# Patient Record
Sex: MALE | Race: OTHER | Employment: FULL TIME | URBAN - NONMETROPOLITAN AREA
[De-identification: names, ages, dates, MRNs, and addresses within clinical notes are randomized per-mention and may not be internally consistent; named-entity substitution may affect disease eponyms.]

---

## 2020-05-04 ENCOUNTER — HOSPITAL ENCOUNTER (EMERGENCY)
Age: 30
Discharge: HOME | End: 2020-05-04
Attending: EMERGENCY MEDICINE

## 2020-05-04 VITALS
OXYGEN SATURATION: 97 % | TEMPERATURE: 98.5 F | RESPIRATION RATE: 18 BRPM | HEART RATE: 65 BPM | SYSTOLIC BLOOD PRESSURE: 135 MMHG | DIASTOLIC BLOOD PRESSURE: 73 MMHG

## 2020-05-04 PROCEDURE — 99283 EMERGENCY DEPT VISIT LOW MDM: CPT

## 2020-05-04 PROCEDURE — 6360000002 HC RX W HCPCS: Performed by: EMERGENCY MEDICINE

## 2020-05-04 PROCEDURE — 6370000000 HC RX 637 (ALT 250 FOR IP): Performed by: EMERGENCY MEDICINE

## 2020-05-04 PROCEDURE — 96372 THER/PROPH/DIAG INJ SC/IM: CPT

## 2020-05-04 RX ORDER — IBUPROFEN 200 MG
200 TABLET ORAL EVERY 6 HOURS PRN
COMMUNITY

## 2020-05-04 RX ORDER — MEPERIDINE HYDROCHLORIDE 25 MG/ML
50 INJECTION INTRAMUSCULAR; INTRAVENOUS; SUBCUTANEOUS ONCE
Status: COMPLETED | OUTPATIENT
Start: 2020-05-04 | End: 2020-05-04

## 2020-05-04 RX ORDER — KETOROLAC TROMETHAMINE 30 MG/ML
30 INJECTION, SOLUTION INTRAMUSCULAR; INTRAVENOUS ONCE
Status: COMPLETED | OUTPATIENT
Start: 2020-05-04 | End: 2020-05-04

## 2020-05-04 RX ORDER — HYDROCODONE BITARTRATE AND ACETAMINOPHEN 5; 325 MG/1; MG/1
1 TABLET ORAL ONCE
Status: COMPLETED | OUTPATIENT
Start: 2020-05-04 | End: 2020-05-04

## 2020-05-04 RX ORDER — ONDANSETRON 4 MG/1
4 TABLET, ORALLY DISINTEGRATING ORAL ONCE
Status: COMPLETED | OUTPATIENT
Start: 2020-05-04 | End: 2020-05-04

## 2020-05-04 RX ADMIN — MEPERIDINE HYDROCHLORIDE 50 MG: 25 INJECTION INTRAMUSCULAR; INTRAVENOUS; SUBCUTANEOUS at 21:24

## 2020-05-04 RX ADMIN — ONDANSETRON 4 MG: 4 TABLET, ORALLY DISINTEGRATING ORAL at 21:24

## 2020-05-04 RX ADMIN — KETOROLAC TROMETHAMINE 30 MG: 30 INJECTION, SOLUTION INTRAMUSCULAR at 21:24

## 2020-05-04 RX ADMIN — HYDROCODONE BITARTRATE AND ACETAMINOPHEN 1 TABLET: 5; 325 TABLET ORAL at 22:23

## 2020-05-04 SDOH — HEALTH STABILITY: MENTAL HEALTH: HOW OFTEN DO YOU HAVE A DRINK CONTAINING ALCOHOL?: NEVER

## 2020-05-04 ASSESSMENT — ENCOUNTER SYMPTOMS
SHORTNESS OF BREATH: 0
WHEEZING: 0
DIARRHEA: 0
ABDOMINAL PAIN: 0
BACK PAIN: 1
BLOOD IN STOOL: 0
SORE THROAT: 0

## 2020-05-04 ASSESSMENT — PAIN SCALES - GENERAL
PAINLEVEL_OUTOF10: 10
PAINLEVEL_OUTOF10: 5
PAINLEVEL_OUTOF10: 10

## 2020-05-04 ASSESSMENT — PAIN DESCRIPTION - ORIENTATION: ORIENTATION: LEFT

## 2020-05-04 ASSESSMENT — PAIN DESCRIPTION - PAIN TYPE: TYPE: ACUTE PAIN

## 2020-05-04 ASSESSMENT — PAIN DESCRIPTION - LOCATION: LOCATION: BACK

## 2020-05-05 NOTE — ED TRIAGE NOTES
Pt presents with complaints of low back pain. Pt states he lifted something heavy a year ago and has had back pain off and on ever since. Pt was lifting a beam at home on Friday and hurt his lower left back again. Pt has some N/T in his left leg. Pt alert and oriented. Skin pink warm and dry.

## 2020-05-05 NOTE — ED PROVIDER NOTES
[facility]  [address] [facility] [address]  Phone: [phone]    eMERGENCY dEPARTMENT eNCOUnter           [address] [facility]       Chief Complaint   Patient presents with    Back Pain       Nurses Notes reviewed and I agree except as noted in the HPI. HISTORY OF PRESENT ILLNESS    Randall Carrel is a 34 y.o. male who presented via private vehicle with the chief complaint mentioned above. He stated that he was lifting a heavy beam yesterday when he developed sudden back pain. He describes his pain as moderate sharp left lower back pain which is worse with movement and bending. Pain did not radiate anywhere. He denies leg weakness or numbness. He denies bladder or bowel problems. He denies saddle paresthesia. He said that he has a history of chronic recurrent back pain. REVIEW OF SYSTEMS     Review of Systems   Constitutional: Negative for chills and fever. HENT: Negative for sore throat. Respiratory: Negative for shortness of breath and wheezing. Cardiovascular: Negative for chest pain and palpitations. Gastrointestinal: Negative for abdominal pain, blood in stool and diarrhea. Genitourinary: Negative for dysuria and hematuria. Musculoskeletal: Positive for back pain. Negative for neck pain. Neurological: Negative for seizures, syncope and headaches. Psychiatric/Behavioral: Negative for confusion. PAST MEDICAL HISTORY    has no past medical history on file. SURGICAL HISTORY      has no past surgical history on file. CURRENT MEDICATIONS       Discharge Medication List as of 5/4/2020 10:05 PM      CONTINUE these medications which have NOT CHANGED    Details   ibuprofen (ADVIL;MOTRIN) 200 MG tablet Take 200 mg by mouth every 6 hours as needed for PainHistorical Med             ALLERGIES     has No Known Allergies. FAMILY HISTORY     has no family status information on file. family history is not on file.     SOCIAL HISTORY reports that he has never smoked. He has never used smokeless tobacco. He reports that he does not drink alcohol or use drugs. PHYSICAL EXAM     INITIAL VITALS:  temporal temperature is 98.5 °F (36.9 °C). His blood pressure is 135/73 and his pulse is 65. His respiration is 18 and oxygen saturation is 97%. Physical Exam   Constitutional: He appears well-developed and well-nourished. Looks slightly uncomfortable   HENT:   Head: Atraumatic. Neck: Neck supple. Cardiovascular: Normal rate and regular rhythm. Exam reveals no gallop and no friction rub. No murmur heard. Pulmonary/Chest: Effort normal and breath sounds normal.   Abdominal: Soft. Bowel sounds are normal. There is no abdominal tenderness. Musculoskeletal:      Comments: Examination of the back showed no visible abnormality. There was no midline tenderness. He has tenderness to percussion over the left paralumbar soft tissue area. Neurological: He is alert. He has normal strength and normal reflexes. Normal motor strength in both legs   Nursing note and vitals reviewed. DIFFERENTIAL DIAGNOSIS:       DIAGNOSTIC RESULTS     LABS:   Labs Reviewed - No data to display    EMERGENCY DEPARTMENT COURSE:   Vitals:    Vitals:    05/04/20 2053 05/04/20 2158 05/04/20 2223   BP: (!) 150/77 135/73    Pulse: 66 65    Resp: 18 18 18   Temp: 98.5 °F (36.9 °C)     TempSrc: Temporal     SpO2: 98% 97% 97%     He received Demerol and Toradol IM, his pain improved  he was reassured that his neurological examination is normal and there is no red flags for spinal cord emergency. FINAL IMPRESSION      1. Strain of lumbar region, initial encounter    2. Acute left-sided low back pain without sciatica          DISPOSITION/PLAN   He was discharged to his wife in good condition, he was given discharge instructions and was advised to return if worse or new symptoms.     PATIENT REFERRED TO:  TEX Hinojosa CNP [address]

## 2021-12-11 ENCOUNTER — HOSPITAL ENCOUNTER (EMERGENCY)
Age: 31
Discharge: HOME | End: 2021-12-11
Attending: FAMILY MEDICINE

## 2021-12-11 VITALS
DIASTOLIC BLOOD PRESSURE: 71 MMHG | WEIGHT: 238 LBS | BODY MASS INDEX: 43.79 KG/M2 | OXYGEN SATURATION: 98 % | HEART RATE: 75 BPM | SYSTOLIC BLOOD PRESSURE: 126 MMHG | RESPIRATION RATE: 16 BRPM | HEIGHT: 62 IN | TEMPERATURE: 97.2 F

## 2021-12-11 DIAGNOSIS — T15.91XA EYE FOREIGN BODY, RIGHT, INITIAL ENCOUNTER: Primary | ICD-10-CM

## 2021-12-11 PROCEDURE — 2500000003 HC RX 250 WO HCPCS

## 2021-12-11 PROCEDURE — 67938 REMOVE EYELID FOREIGN BODY: CPT

## 2021-12-11 PROCEDURE — 99283 EMERGENCY DEPT VISIT LOW MDM: CPT

## 2021-12-11 RX ORDER — PROPARACAINE HYDROCHLORIDE 5 MG/ML
SOLUTION/ DROPS OPHTHALMIC
Status: COMPLETED
Start: 2021-12-11 | End: 2021-12-11

## 2021-12-11 RX ADMIN — PROPARACAINE HYDROCHLORIDE: 5 SOLUTION/ DROPS OPHTHALMIC at 11:40

## 2021-12-11 ASSESSMENT — PAIN DESCRIPTION - DESCRIPTORS: DESCRIPTORS: ACHING

## 2021-12-11 ASSESSMENT — ENCOUNTER SYMPTOMS
SORE THROAT: 0
DIARRHEA: 0
COUGH: 0
ABDOMINAL PAIN: 0
CONSTIPATION: 0
NAUSEA: 0
VOMITING: 0
EYE REDNESS: 1
EYE DISCHARGE: 0
EYE PAIN: 1
WHEEZING: 0
SHORTNESS OF BREATH: 0
PHOTOPHOBIA: 0

## 2021-12-11 ASSESSMENT — PAIN SCALES - GENERAL: PAINLEVEL_OUTOF10: 5

## 2021-12-11 ASSESSMENT — PAIN DESCRIPTION - LOCATION: LOCATION: EYE

## 2021-12-11 ASSESSMENT — PAIN DESCRIPTION - ORIENTATION: ORIENTATION: RIGHT

## 2021-12-11 ASSESSMENT — PAIN DESCRIPTION - PAIN TYPE: TYPE: ACUTE PAIN

## 2021-12-11 NOTE — ED PROVIDER NOTES
file.    SOCIAL HISTORY      reports that he has never smoked. He has never used smokeless tobacco. He reports that he does not drink alcohol and does not use drugs. PHYSICAL EXAM     INITIAL VITALS:  height is 5' 2\" (1.575 m) and weight is 238 lb (108 kg). His tympanic temperature is 97.2 °F (36.2 °C). His blood pressure is 126/71 and his pulse is 75. His respiration is 16 and oxygen saturation is 98%. Physical Exam  Vitals and nursing note reviewed. Constitutional:       General: He is not in acute distress. Appearance: He is not diaphoretic. HENT:      Head: Normocephalic and atraumatic. Eyes:      General:         Right eye: No discharge. Left eye: No discharge. Extraocular Movements: Extraocular movements intact. Pupils: Pupils are equal, round, and reactive to light. Comments: Minimal right-sided conjunctival injection noted medial to the iris. Fluorescein stain and Woods lamp were used and tiny piece of debris, size of a grain of sand, noted adhering to right upper eyelid. Cardiovascular:      Rate and Rhythm: Normal rate and regular rhythm. Heart sounds: Normal heart sounds. Pulmonary:      Effort: Pulmonary effort is normal.      Breath sounds: Normal breath sounds. Abdominal:      General: Bowel sounds are normal. There is no distension. Palpations: Abdomen is soft. Tenderness: There is no abdominal tenderness. Lymphadenopathy:      Cervical: No cervical adenopathy. Skin:     General: Skin is warm and dry. Neurological:      Mental Status: He is alert.          DIFFERENTIAL DIAGNOSIS:   Foreign body in right eye, corneal abrasion, corneal ulceration    DIAGNOSTIC RESULTS         LABS:   Labs Reviewed - No data to display    DEPARTMENT COURSE:   Vitals:    Vitals:    12/11/21 1048 12/11/21 1145   BP: (!) 145/79 126/71   Pulse: 74 75   Resp: 16 16   Temp: 97.2 °F (36.2 °C)    TempSrc: Tympanic    SpO2: 99% 98%   Weight: 238 lb (108 kg) Height: 5' 2\" (1.575 m)        MDM:  Patient presents for evaluation of possible foreign body in right eye. Tiny piece of debris noted and removed. No corneal abrasion noted on the right. He is discharged home in stable condition with recommended use of over-the-counter lubricating drops for the next few days. Patient understood and was removed with mostly implants. CRITICAL CARE:   None    CONSULTS:  None    PROCEDURES:  Procaine drops placed in the right eye. Fluorescein stain and Woods lamp used to identify presence of right eye corneal abrasion or presence of foreign body. Tiny sand grain sized debris noted on upper eyelid which was removed using a cotton tip moistened with sterile water. No corneal abrasions noted. Patient tolerated the procedure. FINAL IMPRESSION      1.  Eye foreign body, right, initial encounter          DISPOSITION/PLAN   Discharge    PATIENT REFERRED TO:  TEX Ritchie - CNP  [address] [facility]  [phone]    Schedule an appointment as soon as possible for a visit   As needed      DISCHARGEMEDICATIONS:  Discharge Medication List as of 12/11/2021 11:57 AM          (Please note that portions of this note were completedwith a voice recognition program.  Efforts were made to edit the dictations but occasionally words are mis-transcribed.)    MD Daniel Dias MD  12/11/21 5469

## 2021-12-11 NOTE — ED NOTES
Pt resting, resp even and unlabored, skin pink, warm and dry. Pt denies having any eye pain at this time. Pt given discharge instructions and verbalizes understanding, pt released.      Capri Haskins RN  12/11/21 9727

## 2021-12-11 NOTE — ED NOTES
Pt complains of r watery eye and eye irritation. Pt thinks he might have got some dust in his eye and complains of eye feeling scratchy. Pt alert, resp even and unlabored, skin pink, warm and dry, r eye redness noted.      Ricardo Sandoval RN  12/11/21 8618

## 2024-04-27 ENCOUNTER — HOSPITAL ENCOUNTER (EMERGENCY)
Age: 34
Discharge: HOME | End: 2024-04-27
Attending: EMERGENCY MEDICINE

## 2024-04-27 VITALS
OXYGEN SATURATION: 100 % | HEART RATE: 72 BPM | SYSTOLIC BLOOD PRESSURE: 153 MMHG | DIASTOLIC BLOOD PRESSURE: 84 MMHG | RESPIRATION RATE: 16 BRPM | BODY MASS INDEX: 44.26 KG/M2 | WEIGHT: 242 LBS | TEMPERATURE: 99.2 F

## 2024-04-27 DIAGNOSIS — G51.0 BELL'S PALSY: Primary | ICD-10-CM

## 2024-04-27 LAB — GLUCOSE BLD STRIP.AUTO-MCNC: 91 MG/DL (ref 70–108)

## 2024-04-27 PROCEDURE — 82948 REAGENT STRIP/BLOOD GLUCOSE: CPT

## 2024-04-27 PROCEDURE — 99283 EMERGENCY DEPT VISIT LOW MDM: CPT

## 2024-04-27 RX ORDER — VALACYCLOVIR HYDROCHLORIDE 1 G/1
1000 TABLET, FILM COATED ORAL 2 TIMES DAILY
Qty: 14 TABLET | Refills: 0 | Status: SHIPPED | OUTPATIENT
Start: 2024-04-27 | End: 2024-05-04

## 2024-04-27 RX ORDER — PREDNISONE 10 MG/1
TABLET ORAL
Qty: 36 TABLET | Refills: 0 | Status: SHIPPED | OUTPATIENT
Start: 2024-04-27

## 2024-04-27 ASSESSMENT — PAIN - FUNCTIONAL ASSESSMENT
PAIN_FUNCTIONAL_ASSESSMENT: NONE - DENIES PAIN
PAIN_FUNCTIONAL_ASSESSMENT: NONE - DENIES PAIN

## 2024-04-27 NOTE — ED PROVIDER NOTES
[facility]  [address] [place] [id number]  Phone: [phone]  EMERGENCY DEPARTMENT ENCOUNTER      Pt Name: Godfrey Kamara  MRN: [id number]  Birthdate 1990  Date of evaluation: 4/27/2024  Provider: Jose J Gale MD    CHIEF COMPLAINT       Chief Complaint   Patient presents with    Facial Droop     Wed evening started with right facial drooping, right side of face feels funny.          HISTORY OF PRESENT ILLNESS      Godfrey Kamara is a 33 y.o. male who presents to the emergency department with above-noted complaint.  Patient had some facial discomfort started on Wednesday.  Today is currently Saturday.  Started with some discomfort in his right side of his neck trapezius area did not feel right and then the next day send she woke up and had some facial droop.  He has a right-sided facial droop.  Denies other pain headache neck pain vomiting or other problems        REVIEW OF SYSTEMS     Problems with facial grimace  Review of Systems  All systems negative except as marked.     PAST MEDICAL HISTORY     History reviewed. No pertinent past medical history.      SURGICAL HISTORY       History reviewed. No pertinent surgical history.      CURRENT MEDICATIONS       Previous Medications    IBUPROFEN (ADVIL;MOTRIN) 200 MG TABLET    Take 200 mg by mouth every 6 hours as needed for Pain       ALLERGIES       Patient has no known allergies.    FAMILY HISTORY       Family History   Problem Relation Age of Onset    Pneumonia Father           SOCIAL HISTORY       Social History     Tobacco Use    Smoking status: Never    Smokeless tobacco: Never   Substance Use Topics    Alcohol use: Never     Comment: occassionally    Drug use: Never         PHYSICAL EXAM           Physical Exam    VITAL SIGNS: BP (!) 153/84   Pulse 72   Temp 99.2 °F (37.3 °C) (Temporal)   Resp 16   Wt 109.8 kg (242 lb)   SpO2 100%   BMI 44.26 kg/m²    Constitutional:  Alert not toxtic or ill,   HENT:   used:yes       FINAL  REASSESSMENT     4:14 PM     Classic findings of Bell's palsy at this time.  No other interventions.  Cover with Valtrex and prednisone.  Symptoms started on Wednesday not a candidate for any other interventions even if this were related to CVA or other intracranial disaster      PROCEDURES:   none    Procedures     FINAL IMPRESSION      1. Bell's palsy          DISPOSITION/PLAN     DISPOSITION Decision To Discharge 04/27/2024 04:12:25 PM      PATIENT REFERRED TO:  Taisha Mcguire, APRN - [place] [id number] [place] [id number] [phone]    Call   Follow up from ER condition      DISCHARGE MEDICATIONS:  New Prescriptions    PREDNISONE (DELTASONE) 10 MG TABLET    6 p.o., then 4 p.o. q. day for 3 days, 3 p.o. q. day for 3 days, 2 p.o. q. day for 3 days, one p.o. q. day for 3 days    VALACYCLOVIR (VALTREX) 1 G TABLET    Take 1 tablet by mouth 2 times daily for 7 days       (Please note that portions of this note were completed with a voice recognition program.  Efforts were made to edit the dictations but occasionally words are mis-transcribed.)    Jose J Gale MD (electronically signed)  Attending Emergency Physician                      Jose J Gale MD  04/27/24 9066

## 2024-04-27 NOTE — ED NOTES
Pt pink, warm and dry, breathing with ease. Lubricating eye drops encouraged before bedtime. Prescriptions explained, pt states understanding. AVS reviewed. Denies questions or concerns. Pt remains alert and oriented. Pt discharged in stable condition.

## 2024-04-27 NOTE — DISCHARGE INSTRUCTIONS
Wear protective sunglasses to make sure nothing gets bone in your eyes.  You may use saline eyedrops to to keep your eyes moist as this can get a dry eye.  Use Tylenol for any aches or pains.  Take prednisone daily.  Take Valtrex twice a day.  Follow-up with primary care this week

## 2024-04-27 NOTE — ED TRIAGE NOTES
Wed evening started with not being able to move right cheek, denies numbness. Right facial droop, not blinking right eye. Strong and equal grasps and push pull of hands and feet, no drift. PERRL. Pt walked in without difficulty, denies any weakness.